# Patient Record
Sex: MALE | URBAN - METROPOLITAN AREA
[De-identification: names, ages, dates, MRNs, and addresses within clinical notes are randomized per-mention and may not be internally consistent; named-entity substitution may affect disease eponyms.]

---

## 2019-10-03 ENCOUNTER — APPOINTMENT (OUTPATIENT)
Dept: URBAN - METROPOLITAN AREA CLINIC 186 | Age: 29
Setting detail: DERMATOLOGY
End: 2019-10-03

## 2019-10-03 DIAGNOSIS — L30.1 DYSHIDROSIS [POMPHOLYX]: ICD-10-CM

## 2019-10-03 DIAGNOSIS — L65.0 TELOGEN EFFLUVIUM: ICD-10-CM

## 2019-10-03 DIAGNOSIS — L64.8 OTHER ANDROGENIC ALOPECIA: ICD-10-CM

## 2019-10-03 PROBLEM — L30.9 DERMATITIS, UNSPECIFIED: Status: ACTIVE | Noted: 2019-10-03

## 2019-10-03 PROCEDURE — OTHER COUNSELING: OTHER

## 2019-10-03 PROCEDURE — OTHER PRESCRIPTION: OTHER

## 2019-10-03 PROCEDURE — OTHER DIAGNOSIS COMMENT: OTHER

## 2019-10-03 PROCEDURE — OTHER ADDITIONAL NOTES: OTHER

## 2019-10-03 PROCEDURE — OTHER TREATMENT REGIMEN: OTHER

## 2019-10-03 PROCEDURE — 99202 OFFICE O/P NEW SF 15 MIN: CPT

## 2019-10-03 RX ORDER — CLOBETASOL PROPIONATE 0.5 MG/G
CREAM TOPICAL BID
Qty: 1 | Refills: 1 | Status: ERX | COMMUNITY
Start: 2019-10-03

## 2019-10-03 ASSESSMENT — LOCATION SIMPLE DESCRIPTION DERM
LOCATION SIMPLE: SCALP
LOCATION SIMPLE: RIGHT UPPER ARM
LOCATION SIMPLE: LEFT SCALP
LOCATION SIMPLE: LEFT HAND
LOCATION SIMPLE: RIGHT FOREHEAD
LOCATION SIMPLE: RIGHT HAND
LOCATION SIMPLE: LEFT ANKLE
LOCATION SIMPLE: RIGHT FOOT
LOCATION SIMPLE: LEFT UPPER ARM

## 2019-10-03 ASSESSMENT — LOCATION DETAILED DESCRIPTION DERM
LOCATION DETAILED: RIGHT ULNAR DORSAL HAND
LOCATION DETAILED: LEFT DISTAL POSTERIOR UPPER ARM
LOCATION DETAILED: RIGHT DORSAL FOOT
LOCATION DETAILED: RIGHT DISTAL POSTERIOR UPPER ARM
LOCATION DETAILED: RIGHT SUPERIOR LATERAL FOREHEAD
LOCATION DETAILED: LEFT ULNAR DORSAL HAND
LOCATION DETAILED: LEFT SUPERIOR PARIETAL SCALP
LOCATION DETAILED: LEFT ANKLE
LOCATION DETAILED: LEFT CENTRAL FRONTAL SCALP

## 2019-10-03 ASSESSMENT — LOCATION ZONE DERM
LOCATION ZONE: FACE
LOCATION ZONE: SCALP
LOCATION ZONE: ARM
LOCATION ZONE: HAND
LOCATION ZONE: FEET
LOCATION ZONE: LEG

## 2019-10-03 NOTE — PROCEDURE: ADDITIONAL NOTES
Additional Notes: Discussed PRP (platelet-rich plasma). PRP therapy is a 3 step technique that uses the active growth factors in blood plasma to promote hair growth. The result is naturally growing hair in the months following treatment. You can expect to see results within two to three months of treatment. However, it can take as long as a year for the full results of treatment to be visible, as the results gradually improve over the months following treatment. Most PRP therapy requires three treatments 4–6 weeks apart with maintenance treatments every 6-12 months. PRP therapy for hair loss requires no downtime. Patients are able to resume normal activities immediately following treatment, including showering and washing hair. However, we recommend that patients avoid hair dyes and other chemicals for a few days following treatment. Quoted $3200 for package of 4
Detail Level: Simple
Additional Notes: Discussed could be due to decreasing his Rogaine use from twice daily to once daily. Patient has started to apply twice daily more recently. Patient continue applying twice daily to full scalp
Additional Notes: Patient understands with symptoms resolving it is hard to determine definitive diagnosis. Discussed dyshidrotic eczema vs scabies vs Porphyria Cutanea Tarda in detail with patient. Reviewed etiology and treatment options for each.

## 2019-10-03 NOTE — HPI: HAIR LOSS
Previous Labs: No
How Did The Hair Loss Occur?: sudden in onset
What Hair Products Do You Use?: Patient washes hair with Crew

## 2019-10-03 NOTE — PROCEDURE: TREATMENT REGIMEN
Initiate Regimen: Clobetasol cream, apply to affected areas twice daily x3 weeks. Take 1 week off, repeat as needed Start Regimen: Clobetasol cream, apply to affected areas twice daily x3 weeks. Take 1 week off, repeat as needed

## 2019-10-03 NOTE — HPI: RASH
What Type Of Note Output Would You Prefer (Optional)?: Bullet Format
How Severe Is Your Rash?: mild
Is This A New Presentation, Or A Follow-Up?: Rash
Additional History: Patient uses gold bond moisturizer sometimes\\nHe reports washing with Sandie spring in the shower

## 2019-10-03 NOTE — PROCEDURE: DIAGNOSIS COMMENT
Comment: Hair tug and pull with 4-5 hairs on most tugs and telogen hairs under the hair prep.  Discussed overall not much thinning today.  Patient has been less consistent with use of rogaine a few months ago discussed this could be the etiology or stressful event. Discussed typically shedding will stop and hair regrowth will be seen. Recommend restarting rogaine. Also discussed prp.  Patient had bloodwork that was normal within the last year.
Comment: Discussed ddx with patient including dyshidrotic eczema and scabies. Patient recently completed oral prednisone and permethrin.  Discussed difficult to discern today due to recent treatment and faint rash.  Discussed both at length and expectations of the conditions and treatments.  Will switch skin care and start clobetasol bid prn. If flares will revisit for re-evaluation
Detail Level: Zone